# Patient Record
Sex: FEMALE | Race: WHITE | NOT HISPANIC OR LATINO | Employment: UNEMPLOYED | URBAN - METROPOLITAN AREA
[De-identification: names, ages, dates, MRNs, and addresses within clinical notes are randomized per-mention and may not be internally consistent; named-entity substitution may affect disease eponyms.]

---

## 2017-03-08 ENCOUNTER — HOSPITAL ENCOUNTER (OUTPATIENT)
Dept: MAMMOGRAPHY | Facility: CLINIC | Age: 67
Discharge: HOME | End: 2017-03-08
Payer: MEDICARE

## 2017-03-08 DIAGNOSIS — C50.412 MALIGNANT NEOPLASM OF UPPER-OUTER QUADRANT OF LEFT FEMALE BREAST (HCC): ICD-10-CM

## 2017-03-08 PROCEDURE — G0204 DX MAMMO INCL CAD BI: HCPCS

## 2017-03-27 ENCOUNTER — ALLSCRIPTS OFFICE VISIT (OUTPATIENT)
Dept: OTHER | Facility: OTHER | Age: 67
End: 2017-03-27

## 2017-07-13 ENCOUNTER — ALLSCRIPTS OFFICE VISIT (OUTPATIENT)
Dept: OTHER | Facility: OTHER | Age: 67
End: 2017-07-13

## 2017-07-13 ENCOUNTER — TRANSCRIBE ORDERS (OUTPATIENT)
Dept: ADMINISTRATIVE | Facility: HOSPITAL | Age: 67
End: 2017-07-13

## 2017-07-13 ENCOUNTER — LAB (OUTPATIENT)
Dept: LAB | Facility: HOSPITAL | Age: 67
End: 2017-07-13
Attending: INTERNAL MEDICINE
Payer: MEDICARE

## 2017-07-13 DIAGNOSIS — C50.412 MALIGNANT NEOPLASM OF UPPER-OUTER QUADRANT OF LEFT FEMALE BREAST (HCC): ICD-10-CM

## 2017-07-13 LAB
ALBUMIN SERPL BCP-MCNC: 3.8 G/DL (ref 3.5–5)
ALP SERPL-CCNC: 126 U/L (ref 46–116)
ALT SERPL W P-5'-P-CCNC: 20 U/L (ref 12–78)
ANION GAP SERPL CALCULATED.3IONS-SCNC: 7 MMOL/L (ref 4–13)
AST SERPL W P-5'-P-CCNC: 18 U/L (ref 5–45)
BASOPHILS # BLD AUTO: 0.02 THOUSANDS/ΜL (ref 0–0.1)
BASOPHILS NFR BLD AUTO: 1 % (ref 0–1)
BILIRUB SERPL-MCNC: 0.4 MG/DL (ref 0.2–1)
BUN SERPL-MCNC: 16 MG/DL (ref 5–25)
CALCIUM SERPL-MCNC: 9.2 MG/DL (ref 8.3–10.1)
CANCER AG27-29 SERPL-ACNC: 36.1 U/ML (ref 0–42.3)
CHLORIDE SERPL-SCNC: 106 MMOL/L (ref 100–108)
CO2 SERPL-SCNC: 29 MMOL/L (ref 21–32)
CREAT SERPL-MCNC: 0.84 MG/DL (ref 0.6–1.3)
EOSINOPHIL # BLD AUTO: 0.05 THOUSAND/ΜL (ref 0–0.61)
EOSINOPHIL NFR BLD AUTO: 1 % (ref 0–6)
ERYTHROCYTE [DISTWIDTH] IN BLOOD BY AUTOMATED COUNT: 14.9 % (ref 11.6–15.1)
GFR SERPL CREATININE-BSD FRML MDRD: >60 ML/MIN/1.73SQ M
GLUCOSE P FAST SERPL-MCNC: 93 MG/DL (ref 65–99)
HCT VFR BLD AUTO: 41.8 % (ref 34.8–46.1)
HGB BLD-MCNC: 13.6 G/DL (ref 11.5–15.4)
LYMPHOCYTES # BLD AUTO: 0.84 THOUSANDS/ΜL (ref 0.6–4.47)
LYMPHOCYTES NFR BLD AUTO: 21 % (ref 14–44)
MCH RBC QN AUTO: 29.2 PG (ref 26.8–34.3)
MCHC RBC AUTO-ENTMCNC: 32.5 G/DL (ref 31.4–37.4)
MCV RBC AUTO: 90 FL (ref 82–98)
MONOCYTES # BLD AUTO: 0.41 THOUSAND/ΜL (ref 0.17–1.22)
MONOCYTES NFR BLD AUTO: 10 % (ref 4–12)
NEUTROPHILS # BLD AUTO: 2.73 THOUSANDS/ΜL (ref 1.85–7.62)
NEUTS SEG NFR BLD AUTO: 67 % (ref 43–75)
PLATELET # BLD AUTO: 126 THOUSANDS/UL (ref 149–390)
PMV BLD AUTO: 12 FL (ref 8.9–12.7)
POTASSIUM SERPL-SCNC: 3.8 MMOL/L (ref 3.5–5.3)
PROT SERPL-MCNC: 7.4 G/DL (ref 6.4–8.2)
RBC # BLD AUTO: 4.65 MILLION/UL (ref 3.81–5.12)
SODIUM SERPL-SCNC: 142 MMOL/L (ref 136–145)
WBC # BLD AUTO: 4.05 THOUSAND/UL (ref 4.31–10.16)

## 2017-07-13 PROCEDURE — 80053 COMPREHEN METABOLIC PANEL: CPT

## 2017-07-13 PROCEDURE — 86300 IMMUNOASSAY TUMOR CA 15-3: CPT

## 2017-07-13 PROCEDURE — 85025 COMPLETE CBC W/AUTO DIFF WBC: CPT

## 2017-07-13 PROCEDURE — 36415 COLL VENOUS BLD VENIPUNCTURE: CPT

## 2018-01-12 VITALS
HEART RATE: 116 BPM | OXYGEN SATURATION: 98 % | HEIGHT: 68 IN | RESPIRATION RATE: 15 BRPM | WEIGHT: 185.13 LBS | TEMPERATURE: 100 F | BODY MASS INDEX: 28.06 KG/M2 | SYSTOLIC BLOOD PRESSURE: 128 MMHG | DIASTOLIC BLOOD PRESSURE: 62 MMHG

## 2018-01-12 VITALS
RESPIRATION RATE: 16 BRPM | TEMPERATURE: 98.4 F | HEART RATE: 75 BPM | SYSTOLIC BLOOD PRESSURE: 142 MMHG | DIASTOLIC BLOOD PRESSURE: 76 MMHG | OXYGEN SATURATION: 97 % | HEIGHT: 68 IN | BODY MASS INDEX: 27.47 KG/M2 | WEIGHT: 181.25 LBS

## 2018-01-13 NOTE — MISCELLANEOUS
Provider Comments  Provider Comments:   Patient had appointment for today and RBC for mammo and then was going to see Dr Venkatesh Linn  Patient missed both  Called patients cell phone  Patient has been in [place]  She will call us once she is home to reschedule both        Signatures   Electronically signed by : CELESTE Cuevas ; Feb 16 2016  4:08PM EST                       (Author)    Electronically signed by : CELESTE Cuevas ; Feb 16 2016  4:08PM EST                       (Author)

## 2018-01-13 NOTE — PROGRESS NOTES
Assessment    1  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)   2  Decreased white blood cell count (288 50) (D72 819)   3  Thrombocytopenia (287 5) (D69 6)   4  Elevated alkaline phosphatase level (790 5) (R74 8)    Plan  Malignant neoplasm of upper-outer quadrant of left female breast    · (1) CA 27 29; Status:Active; Requested for:01Jul2016;    Perform:[facility]; ABL:[id number];LPCLBIE; For:Malignant neoplasm of upper-outer quadrant of left female breast; Ordered By:Proothi, Ari;   · (1) CEA; Status:Active; Requested for:01Jul2016;    Perform:[facility]; LEI:[id number];JUZDCKD; For:Malignant neoplasm of upper-outer quadrant of left female breast; Ordered By:Proothi, Ari;   · (1) COMPREHENSIVE METABOLIC PANEL; Status:Active; Requested for:01Jul2016;    Perform:[facility]; CXS:[id number];PZBVWWV; For:Malignant neoplasm of upper-outer quadrant of left female breast; Ordered By:Proothi, Ari;   · We recommend that you examine your own breasts for lumps and other changes ;  Status:Complete;   Done: 80TQB9248   Ordered; For:Malignant neoplasm of upper-outer quadrant of left female breast; Ordered By:Proothi, Ari;   · Follow-up visit in 4 Months Evaluation and Treatment  Follow-up  Status: Complete   Done: 67PDS3307 10:15AM   Ordered; For: Malignant neoplasm of upper-outer quadrant of left female breast; Ordered By: Blessing Snyder Performed:  Due: 33RVY9827; Last Updated By: Jena Ruelas; 3/11/2016 12:09:24 PM    Discussion/Summary  Discussion Summary:   Follow-up visit for triple positive left breast cancer status post partial mastectomy in 2014  Tumor was 3 1 cm invasive ductal cancer , grade 2, without lymphovascular invasion, no dermal invasion, neg  3 sentinel lymph nodes, 100% ER, negative WI and 3+ HER-2 positive, stage IIa, T2 N0 G2  She tested negative for BRCA   After 6 cycles of [place] 26 in October 2014 she was continued on Herceptin for a total of one year that she finished in April 2015  Letrozole was started in October 2014 after completion of chemotherapy  She is being continued on letrozole and she'll take letrozole for a minimum of 5 years  She did receive radiation  She has been running slightly high alkaline phosphatase  She also has history of slightly low but stable WBC count and platelet counts   No symptoms at present  No tiredness  No hot flashes  No musculoskeletal symptoms  Reviewed 14 systems  Port-A-Cath had been removed  Physical examination and test results are as recorded and discussed  Patient remains in remission from breast cancer and she stays on letrozole  Also she takes calcium and vitamin D and she stays active  We also discussed self breast examination, eating healthy foods, hydration and exercises as tolerated  Understands and agrees with treatment plan: The treatment plan was reviewed with the patient/guardian  The patient/guardian understands and agrees with the treatment plan   Counseling Documentation With Imm: The patient was counseled regarding diagnostic results, instructions for management, prognosis, patient and family education, impressions, risks and benefits of treatment options, importance of compliance with treatment  total time of encounter was 30 minutes and 20 minutes was spent counseling  Chief Complaint  Chief Complaint: Chief Complaint:   The patient presents to the office today with breast cancer  History of Present Illness  HPI: Follow-up visit for triple positive left breast cancer status post partial mastectomy in 2014  Tumor was 3 1 cm invasive ductal cancer , grade 2, without lymphovascular invasion, no dermal invasion, neg  3 sentinel lymph nodes, 100% ER, negative CA and 3+ HER-2 positive, stage IIa, T2 N0 G2  She tested negative for BRCA   After 6 cycles of [place] 26 in October 2014 she was continued on Herceptin for a total of one year that she finished in April 2015  Letrozole was started in October 2014 after completion of chemotherapy  She is being continued on letrozole and she'll take letrozole for a minimum of 5 years  She did receive radiation  She has been running slightly high alkaline phosphatase  She also has history of slightly low but stable WBC count and platelet counts   No symptoms at present  No tiredness  No hot flashes  No musculoskeletal symptoms  Reviewed 14 systems  Port-A-Cath had been removed  Interval History: Nausea controlled  Fatigue until yesterday  Looking forward to daughter's wedding 9/27/14      Review of Systems                  Reviewed 14 systems  No symptoms  Complete-Female:   Constitutional: No fever, no chills, feels well, no tiredness, no recent weight gain or weight loss, not feeling poorly and not feeling tired  Eyes: No complaints of eye pain, no red eyes, no eyesight problems, no discharge, no dry eyes, no itching of eyes  ENT: no complaints of earache, no loss of hearing, no nose bleeds, no nasal discharge, no sore throat, no hoarseness, no earache, no nosebleeds, no sore throat, no hearing loss, no nasal discharge and no hoarseness  Cardiovascular: No complaints of slow heart rate, no fast heart rate, no chest pain, no palpitations, no leg claudication, no lower extremity edema  Respiratory: No complaints of shortness of breath, no wheezing, no cough, no SOB on exertion, no orthopnea, no PND  Gastrointestinal: No complaints of abdominal pain, no constipation, no nausea or vomiting, no diarrhea, no bloody stools  Genitourinary: No complaints of dysuria, no incontinence, no pelvic pain, no dysmenorrhea, no vaginal discharge or bleeding, no dysuria, no pelvic pain, no incontinence and no dysmenorrhea  Musculoskeletal: No complaints of arthralgias, no myalgias, no joint swelling or stiffness, no limb pain or swelling     Integumentary: No complaints of skin rash or lesions, no itching, no skin wounds, no breast pain or lump, as noted in HPI, no itching, no breast pain, no skin lesions, no skin wound and no breast lump  Neurological: No complaints of headache, no confusion, no convulsions, no numbness, no dizziness or fainting, no tingling, no limb weakness, no difficulty walking  Psychiatric: Not suicidal, no sleep disturbance, no anxiety or depression, no change in personality, no emotional problems  Endocrine: No complaints of proptosis, no hot flashes, no muscle weakness, no deepening of the voice, no feelings of weakness, no muscle weakness, no hot flashes and no deepening of the voice  no feelings of weakness   Hematologic/Lymphatic: No complaints of swollen glands, no swollen glands in the neck, does not bleed easily, does not bruise easily  ROS Reviewed:   ROS reviewed  Active Problems    1  Decreased white blood cell count (288 50) (D72 819)   2  Depression (311) (F32 9)   3  Elevated alkaline phosphatase level (790 5) (R74 8)   4  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)   5  Nausea (787 02) (R11 0)   6  Nonallopathic lesion of thoracic region (739 2) (M99 02)   7  Oral thrush (112 0) (B37 0)   8  Rash, drug (693 0) (L27 0)   9  Thrombocytopenia (287 5) (D69 6)   10  Use of letrozole (Femara) (V07 52) (L03 998)    Past Medical History    1  History of Acute mastitis of left breast (611 0) (N61)   2  History of Age At First Period 14 Years Old (Menarche)   3  History of Age At First Pregnancy 28 Years Old   4  History of BRCA negative (V82 71) (Z13 71)   5  History of Chronic / Recurring Headaches   6  History of backache (V13 59) (Z87 39)   7  History of mitral valve disorder (V12 59) (Z86 79)   8  History of Joint pain, knee (719 46) (M25 569)   9  History of Mammogram abnormal (793 80) (R92 8)   10  History of Neck pain (723 1) (M54 2)   11  History of On antineoplastic chemotherapy (V58 69) (Z79 899)   12   History of Pancytopenia due to chemotherapy (284 11) (A98 923)   13  History of Previous Pregnancies Resulted In 1  Live Birth(S)   14  History of Previously Pregnant Including 1  Miscarriage(S)   15  History of Radiation Therapy (V15 3)   16  History of Vision problem (V41 0) (H54 7)  Past Medical History Reviewed: The past medical history was reviewed and updated today  Surgical History    1  History of Appendectomy   2  History of Breast Surgery Lumpectomy   3  History of Breast Surgery Reduction Procedure Bilateral   4  History of [address]   5  History of Cholecystectomy   6  History of Gastric Surgery For Morbid Obesity Gastric Bypass   7  History of General Surgery   8  History of Laparoscopy (Diagnostic)   9  History of Oral Surgery Tooth Extraction   10  History of Reported Hx Of Breast Surgery For Biopsy   11  History of [place] Lymph Node Biopsy  Surgical History Reviewed: The surgical history was reviewed and updated today  Family History    1  Family history of Breast Cancer (V16 3)    2  Family history of Bone Cancer   3  Family history of Lung Cancer (V16 1)    4  Family history of Colon Cancer (V16 0)    5  Family history of Breast Cancer (V16 3)   6  Family history of Carcinoma Of The Stomach (V16 0)  Family History Reviewed: The family history was reviewed and updated today  Social History    · Denied: History of Alcohol Use (History)   · Caffeine Use   · Never A Smoker  Social History Reviewed: The social history was reviewed and is unchanged  Current Meds   1  Calcium Citrate + Oral Tablet; Therapy: (Recorded:57Mhr5662) to Recorded   2  Daily Multiple Vitamins Oral Tablet; Therapy: (Recorded:13May2015) to Recorded   3  Iron Supplement 325 (65 Fe) MG Oral Tablet; Therapy: (Recorded:13May2015) to Recorded   4  Letrozole 2 5 MG Oral Tablet; TAKE 1 TABLET ONCE DAILY; Therapy: 90XZR4116 to (Evaluate:18Nov2016)  Requested for: 67SII3139;  Last   Rx:24Nov2015 Ordered 5  Pepcid 20 MG Oral Tablet; Take 1 tablet twice daily; Therapy: (Recorded:13May2015) to Recorded   6  Sertraline HCl - 100 MG Oral Tablet; Therapy: (Recorded:13May2015) to Recorded   7  Sertraline HCl - 50 MG Oral Tablet; Therapy: (Recorded:13May2015) to Recorded   8  Vitamin B-12 500 MCG Oral Tablet; Therapy: (Recorded:13May2015) to Recorded   9  Vitamin D (Ergocalciferol) 48028 UNIT Oral Capsule; Therapy: (Recorded:13May2015) to Recorded    Allergies    1  No Known Drug Allergies    2  Chocolate   3  Peanuts   4  TOMATO               Reviewed     Vitals  Vital Signs [Data Includes: Current Encounter]    Recorded: [id number] 11:43AM   Temperature 98 F   Heart Rate 73   Respiration 16   Systolic 098   Diastolic 70   Height 5 ft 8 in   Weight 179 lb 6 08 oz   BMI Calculated 27 27   BSA Calculated 1 96   O2 Saturation 98   Pain Scale 0              Stable     Physical Exam             No significant change in examination  ECOG 0      Constitutional   General appearance: No acute distress, well appearing and well nourished  Alert, oriented and not in distress  Head and Face   Head and face: Normal     Eyes   Conjunctiva and lids: No swelling, erythema or discharge  Pupils and irises: Equal, round, reactive to light  No icterus  Ears, Nose, Mouth, and Throat   External inspection of ears and nose: Normal   No oral thrush  Neck   Neck: Supple, symmetric, trachea midline, no masses  Thyroid: Normal, no thyromegaly  Pulmonary   Respiratory effort: No increased work of breathing or signs of respiratory distress  Percussion of chest: Normal     Auscultation of lungs: Clear to auscultation  Cardiovascular   Auscultation of heart: Normal rate and rhythm, normal S1 and S2, no murmurs  Pedal pulses: 2+ bilaterally  Examination of extremities for edema and/or varicosities: Normal   No calf tenderness  Chest   Breasts: Normal, no dimpling or skin changes appreciated      Palpation of breasts and axillae: Normal, no masses palpated  No palpable breast mass  Abdomen   Abdomen: Non-tender, no masses  Liver and spleen: No hepatomegaly or splenomegaly  Genitourinary She goes to her gynecologist    Lymphatic   Palpation of lymph nodes in neck: No lymphadenopathy  Palpation of lymph nodes in axillae: No lymphadenopathy  Palpation of lymph nodes in groin: No lymphadenopathy  Musculoskeletal   Gait and station: Normal     Digits and nails: Normal without clubbing or cyanosis  Joints, bones, and muscles: Normal     Range of motion: Normal     Stability: Normal     Muscle strength/tone: Normal     Skin   Skin and subcutaneous tissue: Normal without rashes or lesions  Palpation of skin and subcutaneous tissue: Normal turgor  Neurologic   Cranial nerves: Cranial nerves II-XII intact  Cortical function: Normal mental status  Coordination: Normal finger to nose and heel to shin  Psychiatric   Judgment and insight: Normal     Orientation to person, place, and time: Normal     Recent and remote memory: Intact  Mood and affect: Normal        Results/Data  Results   MAMMO DIAGNOSTIC BILATERAL W CAD [id number] 01:08PM David Ser     Test Name Result Flag Reference   MAMMO DIAGNOSTIC BILATERAL W CAD (Report)     Patient History:   Patient is postmenopausal, had previous chemotherapy at age 71,    has history of cancer in the left breast at age 58, and had first   child at age 28  Family history of breast cancer in mother at age 76 and breast    cancer in maternal uncle at age 48 or over  Chemotherapy, October 2014  Radiation therapy of the left    breast, August 2014  Malignant lumpectomy of the left breast,    April 11, 2014  Malignant WBUS guided breast biopsy of the left    breast, March 6, 2014  Pathology Report of both breasts, March 6, 2014  Reductions of both breasts, 2005  Benign stereotactic    core biopsy of the right breast, 2004   Benign excisional biopsy of the right breast, 2002  Took estrogen for 10 years beginning at age 42  Took    progesterone for 10 years beginning at age 42  Patient's BMI is 24 4  Reason for exam: history of breast cancer, conservation therapy  Lobectomy for left breast malignancy  Right-sided breast    reduction  Mammo Diagnostic Bilateral W CAD: March 3, 2016 - Check In #:    [de-identified]   Bilateral MLO, CC, and XCCL view(s) were taken  CC with    magnification and ML with magnification view(s) were taken of the   left breast      Technologist: Antoine Saint, R T (LY)(M)   Prior study comparison: July 21, 2015, left breast unilateral    diagnostic mammogram performed at [facility]  February 5, 2015, bilateral WB digitl bilat [place]    performed at [facility]  March 5, 2014,    bilateral WB digitl bilat [place] performed at [facility]  February 25, 2014, bilateral OPMA digital scrn    mammo w/CAD, performed at [address] [place] [address]  There are scattered fibroglandular densities  Postsurgical    tissue distortion is noted in the posterior upper left breast and   throughout right breast parenchyma, stable from previous    examinations  There are no new dominant masses, foci of    architectural distortion or suspicious clusters of calcification    in either breast to suggest malignancy  ASSESSMENT: BiRad:2 - Benign     Recommendation:   Follow-up diagnostic mammogram of both breasts in 1 year  Analyzed by CAD     8-10% of cancers will be missed on mammography  Management of a    palpable abnormality must be based on clinical grounds  Patients   will be notified of their results via letter from our facility  Accredited by [employer] of Radiology and FDA       Transcription Location: [facility] [place] [facility]: [id number]     Risk Value(s):   Myriad Table: 2 2%, MRS : Based on personal and/or    family history, consideration of hereditary risk assessment may    be warranted  (1) CBC/PLT/DIFF [id number] 08:57AM ProothiJoseAri     Test Name Result Flag Reference   WBC COUNT 3 75 Thousand/uL L 4 31-10 16   RBC COUNT 4 36 Million/uL  3 81-5 12   HEMOGLOBIN 13 1 g/dL  11 5-15 4   HEMATOCRIT 39 8 %  34 8-46  1   MCV 91 fL  82-98   MCH 30 0 pg  26 8-34 3   MCHC 32 9 g/dL  31 4-37 4   RDW 14 7 %  11 6-15 1   MPV 11 0 fL  8 9-12 7   PLATELET COUNT 397 Thousands/uL L 149-390   NEUTROPHILS RELATIVE PERCENT 69 %  43-75   LYMPHOCYTES RELATIVE PERCENT 19 %  14-44   MONOCYTES RELATIVE PERCENT 10 %  4-12   EOSINOPHILS RELATIVE PERCENT 2 %  0-6   BASOPHILS RELATIVE PERCENT 0 %  0-1   NEUTROPHILS ABSOLUTE COUNT 2 61 Thousands/µL  1 85-7 62   LYMPHOCYTES ABSOLUTE COUNT 0 70 Thousands/µL  0 60-4 47   MONOCYTES ABSOLUTE COUNT 0 36 Thousand/µL  0 17-1 22   EOSINOPHILS ABSOLUTE COUNT 0 07 Thousand/µL  0 00-0 61   BASOPHILS ABSOLUTE COUNT 0 01 Thousands/µL  0 00-0 10     (1) COMPREHENSIVE METABOLIC PANEL 31TBF2202 13:60NS Jeison Rodriguez   National Kidney Disease Education Program recommendations are as follows:  GFR calculation is accurate only with a steady state creatinine  Chronic Kidney disease less than 60 ml/min/1 73 sq  meters  Kidney failure less than 15 ml/min/1 73 sq  meters  Test Name Result Flag Reference   GLUCOSE,RANDM 100 mg/dL     If the patient is fasting, the ADA then defines impaired fasting glucose as > 100 mg/dL and diabetes as > or equal to 123 mg/dL     SODIUM 143 mmol/L  136-145   POTASSIUM 4 0 mmol/L  3 5-5 3   CHLORIDE 107 mmol/L  100-108   CARBON DIOXIDE 31 mmol/L  21-32   ANION GAP (CALC) 5 mmol/L  4-13   BLOOD UREA NITROGEN 20 mg/dL  5-25   CREATININE 0 82 mg/dL  0 60-1 30   Standardized to IDMS reference method   CALCIUM 9 1 mg/dL  8 3-10 1   BILI, TOTAL 0 39 mg/dL  0 20-1 00   ALK PHOSPHATAS 159 U/L H    ALT (SGPT) 24 U/L  12-78   AST(SGOT) 23 U/L  5-45   ALBUMIN 3 5 g/dL  3 5-5 0   TOTAL PROTEIN 7 3 g/dL  6 4-8 2   eGFR Non-African American      >60 0 ml/min/1 73sq m     (1) CA 27 29 [id number] 08:57AM Proothi, Ari     Test Name Result Flag Reference   CA 27 29(NEW) 27 5 U/mL  0 0-42 3     (1) ALKALINE PHOSPHATASE ISOENZYMES 25SIY1428 08:57AM ProothiJeison   Performed at:  [facility] [employer] [employer] [address]  [id number]  : Cedric Velásquez MD, Phone:  [phone]  Performed at:  [address]  [id number]  : Lily Gann MD, Phone:  [phone]     Test Name Result Flag Reference   ALK PHOSPHATAS 143 IU/L H 39 - 117   BONE ISOENZYMES 34 %  14 - 68   INTEST ISOENZYM 0 %  0 - 18   LIVER ISOENZ 66 %  18 - 85     * Bone/Joint Whole Body [id number] 08:35AM Proothi, Ari     Test Name Result Flag Reference   Bone/Joint Whole Body (Report)     [address];;[place];[id number]   11/30/2015 0836   11/30/2015 1213   NONE     BONE SCAN WHOLE BODY     INDICATION- Elevated alkaline phosphatase/abnormal blood work  History of breast cancer (left)  PREVIOUS FILM CORRELATION-  Left knee radiographs March 5, 2013       TECHNIQUE-  This study was performed following the intravenous   administration of 25 2 mCi Tc-99m labeled MDP  Delayed, anterior and   posterior whole body images were acquired, 2-3 hours after   radiopharmaceutical administration  FINDINGS-     A focus of intensely increased activity is identified in the left side   of the mandible (the patient reports left tooth extraction x2 days)  Mildly increased activity noted in the medial compartment of the left   knee, corresponding with grade graphic evidence of degenerative changes  Minimally increased activity left sternoclavicular joint  No other areas of abnormal accumulation of the radio diagnostic agent   are identified to suggest active skeletal disease   There is no   significant scintigraphic evidence of osseous metastasis  IMPRESSION-     1  No scintigraphic evidence of osseous metastasis  2  Recent dental work/tooth extraction, left mandible  3  Degenerative changes, medial compartment, left knee and probably   left sternoclavicular joint  If clinically warranted, radiographic   correlation recommended  Transcribed on- SHENA Mora MD   [place] Radiologist- SHENA Macedo MD   Electronically Signed- SHENA Macedo MD   Released Date Time- 11/30/15 1220   ------------------------------------------------------------------------------   4184^Y Susan Salcedo MD   4431^Q Susan Salcedo MD     * Ultrasound Abdomen Complete [id number] 07:23AM Proothi, Ari     Test Name Result Flag Reference   U/S Abdom Complete (Report)     [address];;[place];[id number]   11/30/2015 0732   11/30/2015 0819   NONE     ABDOMEN ULTRASOUND, COMPLETE     INDICATION- Malignant neoplasm of female breast, elevated alkaline   phosphatase  COMPARISON- None     TECHNIQUE-  Real-time ultrasound of the abdomen was performed with a   curvilinear transducer with both volumetric sweeps and still imaging   techniques  FINDINGS-     PANCREAS- Visualized portions of the pancreas are within normal limits  AORTA AND IVC- Visualized portions are normal for patient age  LIVER-   Size- Mildly enlarged  The liver measures 18 0 cm in the   midclavicular line  Contour- Surface contour is smooth  Parenchyma- Echogenicity and echotexture are within normal limits  No evidence of suspicious mass  The main portal vein is patent and hepatopetal      BILIARY-   The gallbladder is surgically absent  No intrahepatic biliary dilatation  CBD measures 10 mm, upper normal limits for post cholecystectomy state  No choledocholithiasis  KIDNEY-    Right kidney measures 9 8 x 3 9 cm  Within normal limits       Left kidney measures 9 9 x 3 8 cm  Within normal limits  SPLEEN-    Measures 12 8 cm  Borderline enlarged  No focal lesions  ASCITES- None  IMPRESSION-      Mild hepatosplenomegaly  No focal lesions detected  Status post cholecystectomy  Transcribed on- [address], RAD DO   [place] Radiologist- SHENA Caraballo DO   Electronically Signed- SHENA Caraballo DO   Released Date Time- 11/30/15 0838   ------------------------------------------------------------------------------   08618^TEJAL SEVILLA   82642^TEJAL SEVILLA     Gated Heart Planar Single [id number] 09:29AM Proothi, Ari     Test Name Result Flag Reference   Gated Heart Planar Single (Report)     [address];;[place];[address]   11/20/2015 0935   11/20/2015 1115   NONE     MUGA SCAN     INDICATION- Left breast cancer, postchemotherapy      COMPARISON- 4/29/2015     TECHNIQUE-  The study was performed following in vivo labeling   utilizing 27 of mCi Tc-99m pertechnetate  Gated images of the heart were acquired in the anterior  Pakistani and steep   Pakistani projections  FINDINGS-     Right ventricular motion is unremarkable  There is normal symmetrical contractility of the left ventricle  The   overall resting left ventricular ejection fraction is 69 %, based on   automatic calculation  Previously calculated LVEF was 65%  IMPRESSION-     The resting left ventricular ejection fraction is 69%  This is stable             Transcribed on- [id number]     SHENA Petersen MD   [place] Radiologist- SHENA Parry MD   Electronically Signed- SHENA Parry MD   Released Date Time- 11/20/15 1259   ------------------------------------------------------------------------------   68385^JUAREZ Padgett MD   84657^JUAREZ Padgett MD     Future Appointments    Date/Time Provider Specialty Site   09/07/2016 03:15 PM Adina Niño, CELESTE Hernandez   [facility]urgical [facility]cology [facility] Yogi Sierrat   07/15/2016 10:15 AM Mercedez Rodriguez MD Hematology Oncology [facility] MEDICAL ONCOLOGY     Signatures   Electronically signed by : Welby Sacks, MD; Mar 13 2016  2:26PM EST                       (Author)

## 2018-01-14 NOTE — MISCELLANEOUS
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)   5  Nausea (787 02) (R11 0)   6  Nonallopathic lesion of thoracic region (739 2) (M99 9)   7  Oral thrush (112 0) (B37 0)   8  Rash, drug (693 0) (L27 0)   9  Thrombocytopenia (287 5) (D69 6)   10  Use of letrozole (Femara) (V07 52) (Z79 811)    Current Meds   1  Calcium Citrate + Oral Tablet; Therapy: (Recorded:13May2015) to Recorded   2  Daily Multiple Vitamins Oral Tablet; Therapy: (Recorded:13May2015) to Recorded   3  Iron Supplement 325 (65 Fe) MG Oral Tablet; Therapy: (Recorded:13May2015) to Recorded   4  Letrozole 2 5 MG Oral Tablet; TAKE 1 TABLET ONCE DAILY; Therapy: [id number] to (Evaluate:18Nov2016)  Requested for: 02HHJ8812; Last   Rx:24Nov2015 Ordered   5  Pepcid 20 MG Oral Tablet (Famotidine); Take 1 tablet twice daily; Therapy: (Recorded:13May2015) to Recorded   6  Sertraline HCl - 100 MG Oral Tablet; Therapy: (Recorded:13May2015) to Recorded   7  Sertraline HCl - 50 MG Oral Tablet; Therapy: (Recorded:13May2015) to Recorded   8  Vitamin B-12 500 MCG Oral Tablet; Therapy: (Recorded:13May2015) to Recorded   9  Vitamin D (Ergocalciferol) 07512 UNIT Oral Capsule; Therapy: (Recorded:44Uqt7931) to Recorded    Allergies    1  No Known Drug Allergies    2  Chocolate   3  Peanuts   4  TOMATO    Signatures   Electronically signed by : Jimmy Sage RN; Aug 22 2016  4:08PM EST                       (Author)

## 2018-01-15 NOTE — MISCELLANEOUS
Message   Recorded as Task   Date: 04/20/2016 03:59 PM, Created By: Milton Omer   Task Name: Call Back   Assigned To: Tita Graf   Regarding Patient: Juni Grande, Status: Active   Comment:    Nyla Vaca - 20 Apr 2016 3:59 PM     TASK CREATED  Caller: Self; Other; [phone] (Mobile Phone)  PT HAVING A SURGICAL PROCEDURE AT [address] NEXT WED  THEY WOULD LIKE TO KNOW IF SHE SHOULD GO OFF OF HER FEMERA  PLEASE CALL TO ADVISE, TXS   SPOKE WITH PT AND INFORMED DOES NOT HAVE TO HOLD FEMARA FOR SURGERY  Active Problems    1  Decreased white blood cell count (288 50) (D72 819)   2  Depression (311) (F32 9)   3  Elevated alkaline phosphatase level (790 5) (R74 8)   4  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)   5  Nausea (787 02) (R11 0)   6  Nonallopathic lesion of thoracic region (739 2) (M99 02)   7  Oral thrush (112 0) (B37 0)   8  Rash, drug (693 0) (L27 0)   9  Thrombocytopenia (287 5) (D69 6)   10  Use of letrozole (Femara) (V07 52) (Z79 811)    Current Meds   1  Calcium Citrate + Oral Tablet; Therapy: (Recorded:13May2015) to Recorded   2  Daily Multiple Vitamins Oral Tablet; Therapy: (Recorded:13May2015) to Recorded   3  Iron Supplement 325 (65 Fe) MG Oral Tablet; Therapy: (Recorded:13May2015) to Recorded   4  Letrozole 2 5 MG Oral Tablet; TAKE 1 TABLET ONCE DAILY; Therapy: 04WOO7949 to (Evaluate:18Nov2016)  Requested for: 57VVU7591; Last   Rx:24Nov2015 Ordered   5  Pepcid 20 MG Oral Tablet (Famotidine); Take 1 tablet twice daily; Therapy: (Recorded:13May2015) to Recorded   6  Sertraline HCl - 100 MG Oral Tablet; Therapy: (Recorded:13May2015) to Recorded   7  Sertraline HCl - 50 MG Oral Tablet; Therapy: (Recorded:13May2015) to Recorded   8  Vitamin B-12 500 MCG Oral Tablet; Therapy: (Recorded:13May2015) to Recorded   9  Vitamin D (Ergocalciferol) 52276 UNIT Oral Capsule; Therapy: (Recorded:79Etp3648) to Recorded    Allergies    1  No Known Drug Allergies    2  Chocolate   3  Peanuts   4   TOMATO    Signatures   Electronically signed by : Lindsey Joseph RN; Apr 21 2016 10:01AM EST                       (Author)

## 2018-01-16 NOTE — MISCELLANEOUS
Message   Recorded as Task   Date: 11/03/2016 08:39 AM, Created By: Mariah Bergman   Task Name: Call Back   Assigned To: Colonel Hu   Regarding Patient: Trenton Rasheed, Status: Active   CommentPinkey Lesches - 03 Nov 2016 8:39 AM     TASK CREATED  pt called and had surgery yesterday and the dr told her she is to stop her letrozole, she wants to know if it is ok to stop it    [phone]   Spoke with pt whi had surgery on her arm (bone) and the MD id concerned the Letrozole will interfere will bone healing  Informed Letrozole can increase risk of osteoporosis  Wants her to hold until FU on 11/14/16  Ok to hold given  Active Problems    1  Decreased white blood cell count (288 50) (D72 819)   2  Depression (311) (F32 9)   3  Elevated alkaline phosphatase level (790 5) (R74 8)   4  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)   5  Nausea (787 02) (R11 0)   6  Nonallopathic lesion of thoracic region (739 2) (M99 9)   7  Oral thrush (112 0) (B37 0)   8  Rash, drug (693 0) (L27 0)   9  Thrombocytopenia (287 5) (D69 6)   10  Use of letrozole (Femara) (V07 52) (Z79 811)    Current Meds   1  Advil 200 MG Oral Tablet; take 2 tablet twice daily; Therapy: (Recorded:20Oct2016) to Recorded   2  Daily Multiple Vitamins Oral Tablet; Therapy: (Recorded:21Uze6723) to Recorded   3  Iron Supplement 325 (65 Fe) MG TABS; TAKE 1 TABLET TWICE DAILY; Therapy: (Syed Smith) to Recorded   4  Letrozole 2 5 MG Oral Tablet; TAKE 1 TABLET ONCE DAILY; Therapy: 63KPG4203 to (Evaluate:18Nov2016)  Requested for: 45WWS2375; Last   Rx:24Nov2015 Ordered   5  Opurity B12/Folic Acid 2191-090 MCG Oral Tablet; TAKE 1 TABLET DAILY; Therapy: (Recorded:20Oct2016) to Recorded   6  Opurity Calcium Citrate Plus CHEW; CHEW 4 TABLET Daily; Therapy: (Syed Smith) to Recorded   7  Pepcid 20 MG Oral Tablet (Famotidine); Take 1 tablet twice daily;    Therapy: (Recorded:01Cfp5359) to Recorded 8  Sertraline HCl - 100 MG Oral Tablet; TAKE 1 AND 1/2 TABLETS DAILY; Therapy: (Recorded:20Oct2016) to Recorded   9  Vitamin C 500 MG Oral Tablet; Take 1 tablet twice daily; Therapy: (Recorded:20Oct2016) to Recorded   10  Vitamin D (Ergocalciferol) 59355 UNIT Oral Capsule; TAKE 1 CAPSULE WEEKLY; Therapy: (Recorded:20Oct2016) to Recorded    Allergies    1  No Known Drug Allergies    2  Chocolate   3  Peanuts   4   TOMATO    Signatures   Electronically signed by : Freya Hu RN; Nov  3 2016  9:05AM EST                       (Author)

## 2018-03-01 DIAGNOSIS — C50.412 MALIGNANT NEOPLASM OF UPPER-OUTER QUADRANT OF LEFT FEMALE BREAST (HCC): ICD-10-CM

## 2018-10-17 ENCOUNTER — LAB REQUISITION (OUTPATIENT)
Dept: LAB | Facility: HOSPITAL | Age: 68
End: 2018-10-17
Payer: MEDICARE

## 2018-10-17 DIAGNOSIS — C50.819: ICD-10-CM

## 2018-10-17 PROCEDURE — 88363 XM ARCHIVE TISSUE MOLEC ANAL: CPT | Performed by: PATHOLOGY

## 2018-10-31 LAB — SCAN RESULT: NORMAL

## 2020-09-08 ENCOUNTER — TELEPHONE (OUTPATIENT)
Dept: HEMATOLOGY ONCOLOGY | Facility: CLINIC | Age: 70
End: 2020-09-08

## 2020-09-08 NOTE — TELEPHONE ENCOUNTER
Jolie from [facility] called requesting to ne transferred to Dr Teagan Lim office, regarding SANDEFJORD testing

## 2020-09-14 ENCOUNTER — TELEPHONE (OUTPATIENT)
Dept: SURGICAL ONCOLOGY | Facility: CLINIC | Age: 70
End: 2020-09-14

## 2020-09-14 NOTE — TELEPHONE ENCOUNTER
Jeffery Hollis from [facility] called requesting BRCA results for this patient  Located results from March/April 2014 in [employer] and faxed them to her at [phone]

## 2024-04-22 ENCOUNTER — TELEPHONE (OUTPATIENT)
Dept: HEMATOLOGY ONCOLOGY | Facility: CLINIC | Age: 74
End: 2024-04-22

## 2024-04-22 NOTE — TELEPHONE ENCOUNTER
Returning patient's message regarding medical records to be faxed to [facility].  I left her the medical records department phone number for her to call and request records to be faxed.

## 2024-04-22 NOTE — TELEPHONE ENCOUNTER
Patient Call    Who are you speaking with? Patient    If it is not the patient, are they listed on an active communication consent form? N/A   What is the reason for this call? Patient is requesting what type of chemo treatment she had due to her recurring cancer and now she's in [facility] and they want to know what type to give her   Does this require a call back? Yes   If a call back is required, please list best call back number [phone]   If a call back is required, advise that a message will be forwarded to their care team and someone will return their call as soon as possible.   Did you relay this information to the patient? Yes